# Patient Record
Sex: FEMALE | Race: WHITE | NOT HISPANIC OR LATINO | Employment: PART TIME
[De-identification: names, ages, dates, MRNs, and addresses within clinical notes are randomized per-mention and may not be internally consistent; named-entity substitution may affect disease eponyms.]

---

## 2022-02-06 ENCOUNTER — HEALTH MAINTENANCE LETTER (OUTPATIENT)
Age: 57
End: 2022-02-06

## 2022-10-01 ENCOUNTER — HEALTH MAINTENANCE LETTER (OUTPATIENT)
Age: 57
End: 2022-10-01

## 2023-05-14 ENCOUNTER — HEALTH MAINTENANCE LETTER (OUTPATIENT)
Age: 58
End: 2023-05-14

## 2023-07-03 ENCOUNTER — HOSPITAL ENCOUNTER (OUTPATIENT)
Dept: MAMMOGRAPHY | Facility: CLINIC | Age: 58
Discharge: HOME | End: 2023-07-03
Admitting: RADIOLOGY
Payer: COMMERCIAL

## 2023-07-03 DIAGNOSIS — Z12.31 VISIT FOR SCREENING MAMMOGRAM: ICD-10-CM

## 2023-07-03 PROCEDURE — 77067 SCR MAMMO BI INCL CAD: CPT

## 2023-09-18 ENCOUNTER — LAB (OUTPATIENT)
Dept: FAMILY MEDICINE | Facility: CLINIC | Age: 58
End: 2023-09-18

## 2023-09-18 ENCOUNTER — OFFICE VISIT (OUTPATIENT)
Dept: FAMILY MEDICINE | Facility: CLINIC | Age: 58
End: 2023-09-18
Payer: COMMERCIAL

## 2023-09-18 VITALS
TEMPERATURE: 98.2 F | RESPIRATION RATE: 16 BRPM | WEIGHT: 155.5 LBS | BODY MASS INDEX: 26.55 KG/M2 | HEART RATE: 73 BPM | OXYGEN SATURATION: 98 % | DIASTOLIC BLOOD PRESSURE: 70 MMHG | HEIGHT: 64 IN | SYSTOLIC BLOOD PRESSURE: 110 MMHG

## 2023-09-18 DIAGNOSIS — G89.29 CHRONIC INTRACTABLE HEADACHE, UNSPECIFIED HEADACHE TYPE: ICD-10-CM

## 2023-09-18 DIAGNOSIS — E03.9 HYPOTHYROIDISM, UNSPECIFIED TYPE: ICD-10-CM

## 2023-09-18 DIAGNOSIS — R22.0 FACIAL SWELLING: ICD-10-CM

## 2023-09-18 DIAGNOSIS — Z13.220 SCREENING FOR HYPERLIPIDEMIA: ICD-10-CM

## 2023-09-18 DIAGNOSIS — Z12.11 SCREENING FOR COLON CANCER: ICD-10-CM

## 2023-09-18 DIAGNOSIS — R09.81 CONGESTION OF PARANASAL SINUS: Primary | ICD-10-CM

## 2023-09-18 DIAGNOSIS — R51.9 CHRONIC INTRACTABLE HEADACHE, UNSPECIFIED HEADACHE TYPE: ICD-10-CM

## 2023-09-18 DIAGNOSIS — R63.5 WEIGHT GAIN: ICD-10-CM

## 2023-09-18 PROBLEM — R79.89 ELEVATED TSH: Status: ACTIVE | Noted: 2021-06-08

## 2023-09-18 LAB
ANION GAP SERPL CALCULATED.3IONS-SCNC: 11 MMOL/L (ref 7–15)
BASOPHILS # BLD AUTO: 0 10E3/UL (ref 0–0.2)
BASOPHILS NFR BLD AUTO: 1 %
BUN SERPL-MCNC: 21.6 MG/DL (ref 8–23)
CALCIUM SERPL-MCNC: 9.7 MG/DL (ref 8.6–10)
CHLORIDE SERPL-SCNC: 102 MMOL/L (ref 98–107)
CHOLEST SERPL-MCNC: 342 MG/DL
CREAT SERPL-MCNC: 1.06 MG/DL (ref 0.51–0.95)
DEPRECATED HCO3 PLAS-SCNC: 27 MMOL/L (ref 22–29)
EGFRCR SERPLBLD CKD-EPI 2021: 60 ML/MIN/1.73M2
EOSINOPHIL # BLD AUTO: 0.2 10E3/UL (ref 0–0.7)
EOSINOPHIL NFR BLD AUTO: 4 %
ERYTHROCYTE [DISTWIDTH] IN BLOOD BY AUTOMATED COUNT: 14.3 % (ref 10–15)
GLUCOSE SERPL-MCNC: 89 MG/DL (ref 70–99)
HBA1C MFR BLD: 5.4 % (ref 0–5.6)
HCT VFR BLD AUTO: 36.9 % (ref 35–47)
HDLC SERPL-MCNC: 152 MG/DL
HGB BLD-MCNC: 12 G/DL (ref 11.7–15.7)
IMM GRANULOCYTES # BLD: 0 10E3/UL
IMM GRANULOCYTES NFR BLD: 0 %
LDLC SERPL CALC-MCNC: 180 MG/DL
LYMPHOCYTES # BLD AUTO: 1.5 10E3/UL (ref 0.8–5.3)
LYMPHOCYTES NFR BLD AUTO: 23 %
MCH RBC QN AUTO: 30.1 PG (ref 26.5–33)
MCHC RBC AUTO-ENTMCNC: 32.5 G/DL (ref 31.5–36.5)
MCV RBC AUTO: 93 FL (ref 78–100)
MONOCYTES # BLD AUTO: 0.4 10E3/UL (ref 0–1.3)
MONOCYTES NFR BLD AUTO: 6 %
NEUTROPHILS # BLD AUTO: 4.2 10E3/UL (ref 1.6–8.3)
NEUTROPHILS NFR BLD AUTO: 67 %
NONHDLC SERPL-MCNC: 190 MG/DL
PLATELET # BLD AUTO: 307 10E3/UL (ref 150–450)
POTASSIUM SERPL-SCNC: 4.2 MMOL/L (ref 3.4–5.3)
RBC # BLD AUTO: 3.99 10E6/UL (ref 3.8–5.2)
SODIUM SERPL-SCNC: 140 MMOL/L (ref 136–145)
T4 FREE SERPL-MCNC: <0.1 NG/DL (ref 0.9–1.7)
TRIGL SERPL-MCNC: 48 MG/DL
TSH SERPL DL<=0.005 MIU/L-ACNC: 76.4 UIU/ML (ref 0.3–4.2)
WBC # BLD AUTO: 6.3 10E3/UL (ref 4–11)

## 2023-09-18 PROCEDURE — 80061 LIPID PANEL: CPT | Performed by: NURSE PRACTITIONER

## 2023-09-18 PROCEDURE — 80048 BASIC METABOLIC PNL TOTAL CA: CPT | Performed by: NURSE PRACTITIONER

## 2023-09-18 PROCEDURE — 85025 COMPLETE CBC W/AUTO DIFF WBC: CPT | Performed by: NURSE PRACTITIONER

## 2023-09-18 PROCEDURE — 36415 COLL VENOUS BLD VENIPUNCTURE: CPT | Performed by: NURSE PRACTITIONER

## 2023-09-18 PROCEDURE — 84439 ASSAY OF FREE THYROXINE: CPT | Performed by: NURSE PRACTITIONER

## 2023-09-18 PROCEDURE — 99204 OFFICE O/P NEW MOD 45 MIN: CPT | Performed by: NURSE PRACTITIONER

## 2023-09-18 PROCEDURE — 83036 HEMOGLOBIN GLYCOSYLATED A1C: CPT | Performed by: NURSE PRACTITIONER

## 2023-09-18 PROCEDURE — 84443 ASSAY THYROID STIM HORMONE: CPT | Performed by: NURSE PRACTITIONER

## 2023-09-18 RX ORDER — LACTOBACILLUS RHAMNOSUS GG 10B CELL
1 CAPSULE ORAL 2 TIMES DAILY
COMMUNITY
End: 2023-11-29

## 2023-09-18 RX ORDER — LORATADINE 10 MG/1
10 TABLET ORAL DAILY
COMMUNITY
End: 2023-11-29

## 2023-09-18 RX ORDER — FLUTICASONE PROPIONATE 50 MCG
1 SPRAY, SUSPENSION (ML) NASAL DAILY
Qty: 16 G | Refills: 1 | Status: SHIPPED | OUTPATIENT
Start: 2023-09-18 | End: 2023-11-29

## 2023-09-18 RX ORDER — DOXYCYCLINE 100 MG/1
CAPSULE ORAL
COMMUNITY
Start: 2023-09-13 | End: 2023-11-29

## 2023-09-18 RX ORDER — GUAIFENESIN 1200 MG/1
TABLET, EXTENDED RELEASE ORAL
COMMUNITY
End: 2023-11-29

## 2023-09-18 NOTE — PROGRESS NOTES
Assessment & Plan     This is a 58-year-old patient here for establish care as well as follow-up for urgent care visit for sinusitis.  Patient has allergic rhinitis at baseline which she treats with nonantihistamines.  She started antihistamines of Claritin once daily and over-the-counter decongestant (not pseudoephedrine), and Mucinex.  Discussed with patient to continue Claritin daily and add Flonase 1 spray each nostril daily.  Discussed patient with take to take pseudoephedrine extended release as needed.  Discussed that this is behind the counter at the pharmacy.  Discussed that the red pill she is taking now are not likely to be effective to help with congestion.  Discussed Mucinex is not for allergies and she should discontinue use of that medication as well.  May do saline rinses.  Discussed with patient to finish her last dose of her antibiotic and will repeat a sinus CT scan today.  Patient says she still has postnasal drip as well as the sensation of congestion with headaches.  Discussed risk of rebound headache if taking daily ibuprofen.  Discussed take medication as needed for headaches but sparingly.    Patient to follow-up in 30 days after the use of antihistamine and nasal Flonase.    Congestion of paranasal sinus  See above.  She likely has a secondary to seasonal allergies  Discussed removal and avoidance of allergy triggers.  Patient should close the window at night and use air conditioning or to filter allergies.  She can also use of HEPA filter  - CT Facial Bones without Contrast  - CBC with platelets and differential  - fluticasone (FLONASE) 50 MCG/ACT nasal spray  Dispense: 16 g; Refill: 1  - CBC with platelets and differential    Screening for colon cancer  Discussed risk and benefits of colonoscopy versus Cologuard.  Patient opts for Cologuard.  Discussed that she must place it in the [employer] store on the day of collection.  Patient agrees to plan of care  - COLOGUARD([employer])    Facial  "swelling  Rechecking TSH today as well as CBC with differential to rule out other causes of facial swelling.  Patient denies chronic use of steroids, does not have appearance of Cushing's disease per physical exam today  - CT Facial Bones without Contrast  - TSH with free T4 reflex  - TSH with free T4 reflex    Weight gain  Checking hemoglobin A1c as well as cholesterol.  Discussed diet changes including decrease her intake of carbohydrates and simple sugars  - Lipid Profile (Chol, Trig, HDL, LDL calc)  - Hemoglobin A1c  - Basic metabolic panel  (Ca, Cl, CO2, Creat, Gluc, K, Na, BUN)  - Lipid Profile (Chol, Trig, HDL, LDL calc)  - Hemoglobin A1c  - Basic metabolic panel  (Ca, Cl, CO2, Creat, Gluc, K, Na, BUN)    Chronic intractable headache, unspecified headache type  Discussed differential diagnosis.  Does not appear to be migraine headaches.  Possible tiredness sinus headaches versus tension headaches we will check CT scan  Patient may take Tylenol or ibuprofen as needed I suggest alternating these and not taking them every day as she does have a risk of rebound headaches.  - TSH with free T4 reflex  - Hemoglobin A1c  - Basic metabolic panel  (Ca, Cl, CO2, Creat, Gluc, K, Na, BUN)  - TSH with free T4 reflex  - Hemoglobin A1c  - Basic metabolic panel  (Ca, Cl, CO2, Creat, Gluc, K, Na, BUN)    Screening for hyperlipidemia  Patient is not fasting today we will check cholesterol  - Lipid Profile (Chol, Trig, HDL, LDL calc)  - Basic metabolic panel  (Ca, Cl, CO2, Creat, Gluc, K, Na, BUN)  - Lipid Profile (Chol, Trig, HDL, LDL calc)  - Basic metabolic panel  (Ca, Cl, CO2, Creat, Gluc, K, Na, BUN)          {  BMI:   Estimated body mass index is 26.69 kg/m  as calculated from the following:    Height as of this encounter: 1.626 m (5' 4\").    Weight as of this encounter: 70.5 kg (155 lb 8 oz).   Weight management plan: Discussed healthy diet and exercise guidelines        RENU Correia [facility] " "DAVID Guzman is a 58 year old, presenting for the following health issues:  Follow Up (Follow up from urgent care and would like to talk about doctor for insurance.)        9/18/2023     8:47 AM   Additional Questions   Roomed by lc-lpn   Accompanied by ally         9/18/2023     8:47 AM   Patient Reported Additional Medications   Patient reports taking the following new medications na       HPI   Went to urgent care - unable to see on care everywhere  Wednesday is last day on Abx (doxy BID for 7 days)  Taking madden all clear (Loratidine), pseudoephedrine, and ibuprofen when getting the headaches, and sx started 8/25/23 - was getting headaches and pressure, and puff eyes and     Denies fever, has some PND, no cough, denies sore throat, headaches mild taking ibuprofen 4 tabs/day      Previously did netti pots and still suffered.     Has seasonal allergies - fall and spring - had allergies a long time ago - allergies get worse when windows are open.   No trauma to nose or face    Sister does have allergies - and is \"puffy\"    Eating a lot of carbs - having some weight gain.   And sweets - does this when she is sick     Feels run down, under a lot of stress.     Has electrolyte drinks x2 - LMNT (Ultima)            Review of Systems   Constitutional, HEENT, cardiovascular, pulmonary, gi and gu systems are negative, except as otherwise noted.      Objective    /70 (BP Location: Right arm, Patient Position: Sitting, Cuff Size: Adult Regular)   Pulse 73   Temp 98.2  F (36.8  C) (Oral)   Resp 16   Ht 1.626 m (5' 4\")   Wt 70.5 kg (155 lb 8 oz)   SpO2 98%   BMI 26.69 kg/m    Body mass index is 26.69 kg/m .  Physical Exam   GENERAL: healthy, alert and no distress  ENT: TM normal, posterior pharynx pink , no exudate tonsils 1+ BL. Right turbinate boggy, facial nerve intact BL, right maxillary slightly more \"puffy\" than left, no facial tenderness on palpation or percussion  Mouth: no " lesions, good dental hygiene   NECK: no adenopathy, no asymmetry, masses, or scars and thyroid normal to palpation  RESP: lungs clear to auscultation - no rales, rhonchi or wheezes  CV: regular rate and rhythm, normal S1 S2, no S3 or S4, no murmur, click or rub, no peripheral edema and peripheral pulses strong  ABDOMEN: soft, nontender, no hepatosplenomegaly, no masses and bowel sounds normal  MS: no gross musculoskeletal defects noted, no edema              Results for orders placed or performed in visit on 09/18/23   Hemoglobin A1c     Status: Normal   Result Value Ref Range    Hemoglobin A1C 5.4 0.0 - 5.6 %   CBC with platelets and differential     Status: None   Result Value Ref Range    WBC Count 6.3 4.0 - 11.0 10e3/uL    RBC Count 3.99 3.80 - 5.20 10e6/uL    Hemoglobin 12.0 11.7 - 15.7 g/dL    Hematocrit 36.9 35.0 - 47.0 %    MCV 93 78 - 100 fL    MCH 30.1 26.5 - 33.0 pg    MCHC 32.5 31.5 - 36.5 g/dL    RDW 14.3 10.0 - 15.0 %    Platelet Count 307 150 - 450 10e3/uL    % Neutrophils 67 %    % Lymphocytes 23 %    % Monocytes 6 %    % Eosinophils 4 %    % Basophils 1 %    % Immature Granulocytes 0 %    Absolute Neutrophils 4.2 1.6 - 8.3 10e3/uL    Absolute Lymphocytes 1.5 0.8 - 5.3 10e3/uL    Absolute Monocytes 0.4 0.0 - 1.3 10e3/uL    Absolute Eosinophils 0.2 0.0 - 0.7 10e3/uL    Absolute Basophils 0.0 0.0 - 0.2 10e3/uL    Absolute Immature Granulocytes 0.0 <=0.4 10e3/uL   CBC with platelets and differential     Status: None    Narrative    The following orders were created for panel order CBC with platelets and differential.  Procedure                               Abnormality         Status                     ---------                               -----------         ------                     CBC with platelets and d...[[id number]]                      Final result                 Please view results for these tests on the individual orders.

## 2023-09-18 NOTE — PATIENT INSTRUCTIONS
For congestion:   Continue pseudoephedrine 12 hour (as needed for sinus pressure- purchase   from behind the pharmacy counter), loratadine (madden brand), flonase daily.    -CT scan of sinuses - you'll get a call to schedule.    Headache - Ibuprofen and acetaminophen (switching off - try not to take daily if you can.      -cologard - in the mail  bring in to a [employer] store on same day you collect it (good every 3 years)     - labs results will come in the next day on [employer]

## 2023-09-19 ENCOUNTER — TELEPHONE (OUTPATIENT)
Dept: FAMILY MEDICINE | Facility: CLINIC | Age: 58
End: 2023-09-19
Payer: COMMERCIAL

## 2023-09-19 RX ORDER — LEVOTHYROXINE SODIUM 100 UG/1
100 TABLET ORAL DAILY
Qty: 60 TABLET | Refills: 0 | Status: SHIPPED | OUTPATIENT
Start: 2023-09-19 | End: 2023-11-08

## 2023-09-19 NOTE — TELEPHONE ENCOUNTER
S-(situation): Call back to patient to relay message.    B-(background): See previous    A-(assessment): Per chart review, patient has viewed results on [employer]:      R-(recommendations): Called patient to inform that there is nothing new - the message attached to her results in [employer] has the same information we intended to communicate by phone. Invited call back if further questions or concerns.

## 2023-09-19 NOTE — TELEPHONE ENCOUNTER
"LMTCB, please relay to any available RN    ----- Message from RENU Correia CNP sent at 9/19/2023  9:16 AM CDT -----  Megan,  You have a very high TSH - and low T4.   This means you're experiencing hypothyroidism. I suggest starting on a thyroid medication right away. This medication is called levothyroxine. You should take it first thing in the morning with ag glass of water on an empty stomach.  Wait 20 mins after taking the medication before eating. I've given you some information on hypothyroidism in your patient instructions from yesterday appointment.  This is likely the reason you are experiencing a more \"puffy face.\"     Your HDL is Very high - this is your good cholesterol.  Your LDL is also high.  I recommend starting on a cholesterol lowering medication. This can be discussed at your follow up appointment.     You have no Diabetes, no amenia or acute infection, normal electrolytes. However, you kidney function looks to have changed.  Please do NOT take any IBUPROFEN, (ADVIL, MOTRIN), or ALEVE (Naproxen).  You may take acetaminophen for your headaches.     Please came back in 4-6 weeks to discuss your cholesterol, thyroid, and recheck you labs.     Please let me know if you have any questions or concerns.    Thank you for trusting us with your care. It was a pleasure seeing you.  RENU Correia CNP on 9/19/2023 at 9:09 AM      "

## 2023-09-19 NOTE — RESULT ENCOUNTER NOTE
"Megan,  You have a very high TSH - and low T4.   This means you're experiencing hypothyroidism. I suggest starting on a thyroid medication right away. This medication is called levothyroxine. You should take it first thing in the morning with ag glass of water on an empty stomach.  Wait 20 mins after taking the medication before eating. I've given you some information on hypothyroidism in your patient instructions from yesterday appointment.  This is likely the reason you are experiencing a more \"puffy face.\"     Your HDL is Very high - this is your good cholesterol.  Your LDL is also high.  I recommend starting on a cholesterol lowering medication. This can be discussed at your follow up appointment.     You have no Diabetes, no amenia or acute infection, normal electrolytes. However, you kidney function looks to have changed.  Please do NOT take any IBUPROFEN, (ADVIL, MOTRIN), or ALEVE (Naproxen).  You may take acetaminophen for your headaches.     Please came back in 4-6 weeks to discuss your cholesterol, thyroid, and recheck you labs.     Please let me know if you have any questions or concerns.    Thank you for trusting us with your care. It was a pleasure seeing you.  RENU Correia CNP on 9/19/2023 at 9:09 AM    "

## 2023-09-19 NOTE — TELEPHONE ENCOUNTER
Patient returning a call per message.  Patient said she is at work and would like a call back about 5:00 or 5:30 PM or a message can be left.   Phone number [phone].

## 2023-09-20 ENCOUNTER — HOSPITAL ENCOUNTER (OUTPATIENT)
Dept: CT IMAGING | Facility: CLINIC | Age: 58
Discharge: HOME | End: 2023-09-20
Attending: NURSE PRACTITIONER | Admitting: NURSE PRACTITIONER
Payer: COMMERCIAL

## 2023-09-20 DIAGNOSIS — R22.0 FACIAL SWELLING: ICD-10-CM

## 2023-09-20 DIAGNOSIS — R09.81 CONGESTION OF PARANASAL SINUS: ICD-10-CM

## 2023-09-20 PROCEDURE — 70486 CT MAXILLOFACIAL W/O DYE: CPT

## 2023-09-29 ENCOUNTER — MYC MEDICAL ADVICE (OUTPATIENT)
Dept: FAMILY MEDICINE | Facility: CLINIC | Age: 58
End: 2023-09-29
Payer: COMMERCIAL

## 2023-09-29 DIAGNOSIS — E66.3 OVERWEIGHT (BMI 25.0-29.9): Primary | ICD-10-CM

## 2023-09-29 LAB — NONINV COLON CA DNA+OCC BLD SCRN STL QL: NEGATIVE

## 2023-09-29 NOTE — TELEPHONE ENCOUNTER
Pt called and stated she needs a Dr Note from PCP for her insurance company to approve her to see a nutritionist. Pt will like the letter to read the following information.     Please send back to Pt via [employer]. Please call Pt with any questions/concerns.    Sandrine Teresa

## 2023-10-06 NOTE — TELEPHONE ENCOUNTER
10/06/23  Please send nutrition referral to:  [facility]  Fax:  [phone]   ATTN: Insurance Department /Leyda

## 2023-10-19 ENCOUNTER — MYC MEDICAL ADVICE (OUTPATIENT)
Dept: FAMILY MEDICINE | Facility: CLINIC | Age: 58
End: 2023-10-19
Payer: COMMERCIAL

## 2023-10-19 ENCOUNTER — TELEPHONE (OUTPATIENT)
Dept: FAMILY MEDICINE | Facility: CLINIC | Age: 58
End: 2023-10-19
Payer: COMMERCIAL

## 2023-10-19 DIAGNOSIS — E03.9 HYPOTHYROIDISM, UNSPECIFIED TYPE: Primary | ICD-10-CM

## 2023-10-19 NOTE — TELEPHONE ENCOUNTER
Order/Referral Request    Who is requesting:  Patient's Nutritionist (Patient called in request)    Orders being requested: Labs for   1. TSH  2. Free T4  3. Free T3  4. Antibody    Reason service is needed/diagnosis: Hypothyroid    When are orders needed by: prior to patient seeing PCP on 11/29/23    Has this been discussed with Provider: Yes, patient was referred to Nutritionist    Does patient have a preference on a Group/Provider/Facility? [facility]    Does patient have an appointment scheduled?: Yes: patient's 11/16/29 appointment was [facility]/233 [facility]    Where to send orders: Place orders within Epic    Could we send this information to you in [employer] or would you prefer to receive a phone call?:   Patient would prefer a phone call   Okay to leave a detailed message?: Yes at Cell number on file:    Telephone Information:   Mobile [phone]

## 2023-10-24 NOTE — TELEPHONE ENCOUNTER
LMTCB to schedule a lab only visit. Please help schedule a lab visit if/when patient calls back.  Sharon Alexander MA on 10/24/2023 at 10:31 AM

## 2023-11-01 NOTE — TELEPHONE ENCOUNTER
11/01/23  Pt states the fax number for [facility] has changed so they never received the referral. Please refax to:     [facility]   ATTN: Insurance Department /Leyda   [phone]

## 2023-11-07 ENCOUNTER — LAB (OUTPATIENT)
Dept: LAB | Facility: CLINIC | Age: 58
End: 2023-11-07
Payer: COMMERCIAL

## 2023-11-07 DIAGNOSIS — E03.9 HYPOTHYROIDISM, UNSPECIFIED TYPE: ICD-10-CM

## 2023-11-07 LAB
Lab: NORMAL
PERFORMING LABORATORY: NORMAL
SPECIMEN STATUS: NORMAL
T3 SERPL-MCNC: 66 NG/DL (ref 85–202)
TEST NAME: NORMAL
THYROGLOB AB SERPL IA-ACNC: 23 IU/ML
TSH SERPL DL<=0.005 MIU/L-ACNC: 1.99 UIU/ML (ref 0.3–4.2)

## 2023-11-07 PROCEDURE — 84443 ASSAY THYROID STIM HORMONE: CPT

## 2023-11-07 PROCEDURE — 86800 THYROGLOBULIN ANTIBODY: CPT

## 2023-11-07 PROCEDURE — 86376 MICROSOMAL ANTIBODY EACH: CPT

## 2023-11-07 PROCEDURE — 36415 COLL VENOUS BLD VENIPUNCTURE: CPT

## 2023-11-07 PROCEDURE — 86800 THYROGLOBULIN ANTIBODY: CPT | Mod: 59

## 2023-11-07 PROCEDURE — 84480 ASSAY TRIIODOTHYRONINE (T3): CPT

## 2023-11-08 ENCOUNTER — MYC REFILL (OUTPATIENT)
Dept: FAMILY MEDICINE | Facility: CLINIC | Age: 58
End: 2023-11-08
Payer: COMMERCIAL

## 2023-11-08 DIAGNOSIS — E03.9 HYPOTHYROIDISM, UNSPECIFIED TYPE: ICD-10-CM

## 2023-11-08 RX ORDER — LEVOTHYROXINE SODIUM 100 UG/1
100 TABLET ORAL DAILY
Qty: 60 TABLET | Refills: 0 | Status: SHIPPED | OUTPATIENT
Start: 2023-11-08 | End: 2023-11-29

## 2023-11-08 NOTE — RESULT ENCOUNTER NOTE
Significant improvement seen in your TSH.  Your T4 is normal but your T3 is still little low.  This can lag a slightly.  At this point I think you should continue your current dose of the levothyroxine until we discussed and I do a physical exam with you on the 29th of this month.  If having any further symptoms of hypothyroidism we will consider increasing the dose versus just rechecking the TSH.  I am also adding a TPO level which will let me know if this is an autoimmune disorder which can highlight the cause of this abnormal thyroid result.  Please me know if you have any further questions, otherwise we can discuss this in depth on the 29th as well

## 2023-11-09 LAB — THYROPEROXIDASE AB SERPL-ACNC: 468 IU/ML

## 2023-11-14 LAB — MISCELLANEOUS TEST 1 (ARUP): ABNORMAL

## 2023-11-18 NOTE — RESULT ENCOUNTER NOTE
This labs confirms that your thyroid abnormality is characterized by Hashimoto's Thyroiditis- an auto immune disorder.

## 2023-11-29 ENCOUNTER — OFFICE VISIT (OUTPATIENT)
Dept: FAMILY MEDICINE | Facility: CLINIC | Age: 58
End: 2023-11-29
Payer: COMMERCIAL

## 2023-11-29 VITALS
SYSTOLIC BLOOD PRESSURE: 100 MMHG | DIASTOLIC BLOOD PRESSURE: 74 MMHG | TEMPERATURE: 98.2 F | BODY MASS INDEX: 26.14 KG/M2 | HEIGHT: 64 IN | WEIGHT: 153.1 LBS | RESPIRATION RATE: 16 BRPM | OXYGEN SATURATION: 99 % | HEART RATE: 67 BPM

## 2023-11-29 DIAGNOSIS — E06.3 HASHIMOTO'S THYROIDITIS: Primary | ICD-10-CM

## 2023-11-29 DIAGNOSIS — E03.9 HYPOTHYROIDISM, UNSPECIFIED TYPE: ICD-10-CM

## 2023-11-29 DIAGNOSIS — R09.A2 GLOBUS SENSATION: ICD-10-CM

## 2023-11-29 LAB
T3 SERPL-MCNC: 72 NG/DL (ref 85–202)
TSH SERPL DL<=0.005 MIU/L-ACNC: 0.84 UIU/ML (ref 0.3–4.2)

## 2023-11-29 PROCEDURE — 36415 COLL VENOUS BLD VENIPUNCTURE: CPT | Performed by: NURSE PRACTITIONER

## 2023-11-29 PROCEDURE — 99214 OFFICE O/P EST MOD 30 MIN: CPT | Performed by: NURSE PRACTITIONER

## 2023-11-29 PROCEDURE — 84480 ASSAY TRIIODOTHYRONINE (T3): CPT | Performed by: NURSE PRACTITIONER

## 2023-11-29 PROCEDURE — 84439 ASSAY OF FREE THYROXINE: CPT | Performed by: NURSE PRACTITIONER

## 2023-11-29 PROCEDURE — 84443 ASSAY THYROID STIM HORMONE: CPT | Performed by: NURSE PRACTITIONER

## 2023-11-29 RX ORDER — FAMOTIDINE 20 MG/1
20 TABLET, FILM COATED ORAL 2 TIMES DAILY
COMMUNITY
Start: 2023-11-29

## 2023-11-29 RX ORDER — LEVOTHYROXINE SODIUM 100 UG/1
100 TABLET ORAL DAILY
Qty: 90 TABLET | Refills: 1 | Status: SHIPPED | OUTPATIENT
Start: 2023-11-29 | End: 2024-05-31

## 2023-11-29 NOTE — PROGRESS NOTES
Assessment & Plan     Hypothyroidism, Hashimoto's  Patient on levo 100 mcg.  Will continue this at this time rechecking TSH T4 T3 today.  Symptomatically patient is feeling better, more energy, less weight gain, less puffiness, improved hair and skin.  TSH and T3 are in good range today will continue with the follow-up every 6 months for the first year and then annually  - TSH with free T4 reflex  - T3, total  - TSH with free T4 reflex  - T3, total    Globus sensation  Patient presents today with history of chronic congestion, and high phlegm production to which she seen some improvement since being treated for hypothyroidism as well as diet change.    She has globus sensation in the throat she reports having for years, that is negative for pain current, coughing, throat clearing after eating or drinking but does report worsening symptoms with frequent talking.  She does not have any history of reflux but wonders if that is the case.  -Information about GERD precautions given to patient today discussed she may also do a trial of p.o. famotidine with dinner for 30 to 60 days to see if this improves.  -Referral placed to ENT to perform a scope to rule out masses, nodules, and observe for signs of GERD  - Adult ENT  Referral    Spent 30mins doing chart review, history and exam, patient education, documentation and further activities per the note.                   RENU Correia [facility]    Adriel Guzman is a 58 year old, presenting for the following health issues:  Follow Up (Follow up on thyroid, and medications. Throat fells like she has mucus in the back of her throat, silent reflex, and dry throat.)        11/29/2023     7:41 AM   Additional Questions   Roomed by lc-lpn   Accompanied by n/a       History of Present Illness       Hypothyroidism:     Since last visit, patient describes the following symptoms::  Anxiety, Constipation, Dry skin and Hair  "loss    She eats 4 or more servings of fruits and vegetables daily.She consumes 0 sweetened beverage(s) daily.She exercises with enough effort to increase her heart rate 10 to 19 minutes per day.  She exercises with enough effort to increase her heart rate 4 days per week.   She is taking medications regularly.     Thyroid:   Denies hot or cold intolerance,  has hot flashes here and there-will sometimes wake up hot. Feels her energy better, and dry skin has helped. She feels her hair has improved   She is on a \"Thyroid diet\"    Taking magnesium glycinate 400mg. Having BM daily and lerger quantity - does drink water. Taking \"the greens\" drink    Has been changing her diet: gluten, dairy free, and egg free, has already had difficulties with sinuses, and needed mucinex, feels like a mass, when she talks she feels she needs to be drinking to help her.  Not talking a lot at her job now, but if she did she herrera feel her voice getting tired.     She feels that when she eats eggs her symptoms are worse - phlegm  .   Overall, since starting Levothyroxine 100mg congestion has improved, weight gain has improved              Review of Systems   Constitutional, HEENT, cardiovascular, pulmonary, gi and gu systems are negative, except as otherwise noted.      Objective    /74 (BP Location: Right arm, Patient Position: Sitting, Cuff Size: Adult Regular)   Pulse 67   Temp 98.2  F (36.8  C) (Oral)   Resp 16   Ht 1.626 m (5' 4\")   Wt 69.4 kg (153 lb 1.6 oz)   SpO2 99%   BMI 26.28 kg/m    Body mass index is 26.28 kg/m .  Physical Exam   GENERAL: healthy, alert and no distress  NECK: no adenopathy, no asymmetry, masses, or scars and thyroid normal to palpation  RESP: lungs clear to auscultation - no rales, rhonchi or wheezes  CV: regular rate and rhythm, normal S1 S2, no S3 or S4, no murmur, click or rub, no peripheral edema and peripheral pulses strong  MS: no gross musculoskeletal defects noted, no edema            "

## 2023-11-30 ENCOUNTER — MYC MEDICAL ADVICE (OUTPATIENT)
Dept: FAMILY MEDICINE | Facility: CLINIC | Age: 58
End: 2023-11-30
Payer: COMMERCIAL

## 2023-11-30 DIAGNOSIS — E03.9 HYPOTHYROIDISM, UNSPECIFIED TYPE: ICD-10-CM

## 2023-11-30 DIAGNOSIS — E06.3 HASHIMOTO'S THYROIDITIS: Primary | ICD-10-CM

## 2023-11-30 LAB — T4 FREE SERPL-MCNC: 1.68 NG/DL (ref 0.9–1.7)

## 2023-11-30 NOTE — RESULT ENCOUNTER NOTE
Your TSH is on the low end of normal.  At this point we should continue the current dose of Levo, as increasing the dose would lower your TSH too much.  Your T3 continues to be slightly low. I will check your T4 level today as well.

## 2023-12-05 ENCOUNTER — LAB (OUTPATIENT)
Dept: LAB | Facility: CLINIC | Age: 58
End: 2023-12-05
Payer: COMMERCIAL

## 2023-12-05 DIAGNOSIS — E03.9 HYPOTHYROIDISM, UNSPECIFIED TYPE: ICD-10-CM

## 2023-12-05 DIAGNOSIS — E06.3 HASHIMOTO'S THYROIDITIS: ICD-10-CM

## 2023-12-05 PROCEDURE — 36415 COLL VENOUS BLD VENIPUNCTURE: CPT

## 2023-12-05 PROCEDURE — 99000 SPECIMEN HANDLING OFFICE-LAB: CPT

## 2023-12-05 PROCEDURE — 84482 T3 REVERSE: CPT | Mod: 90

## 2023-12-08 LAB — T3REVERSE SERPL-MCNC: 15.5 NG/DL

## 2024-01-05 ENCOUNTER — MYC REFILL (OUTPATIENT)
Dept: FAMILY MEDICINE | Facility: CLINIC | Age: 59
End: 2024-01-05
Payer: COMMERCIAL

## 2024-01-05 DIAGNOSIS — E03.9 HYPOTHYROIDISM, UNSPECIFIED TYPE: ICD-10-CM

## 2024-01-05 RX ORDER — LEVOTHYROXINE SODIUM 100 UG/1
100 TABLET ORAL DAILY
Qty: 90 TABLET | Refills: 1 | OUTPATIENT
Start: 2024-01-05

## 2024-05-18 ENCOUNTER — HEALTH MAINTENANCE LETTER (OUTPATIENT)
Age: 59
End: 2024-05-18

## 2024-05-28 ENCOUNTER — MYC MEDICAL ADVICE (OUTPATIENT)
Dept: FAMILY MEDICINE | Facility: CLINIC | Age: 59
End: 2024-05-28

## 2024-05-28 ENCOUNTER — LAB (OUTPATIENT)
Dept: LAB | Facility: CLINIC | Age: 59
End: 2024-05-28
Payer: COMMERCIAL

## 2024-05-28 DIAGNOSIS — Z11.4 SCREENING FOR HIV (HUMAN IMMUNODEFICIENCY VIRUS): Primary | ICD-10-CM

## 2024-05-28 DIAGNOSIS — Z11.59 NEED FOR HEPATITIS C SCREENING TEST: ICD-10-CM

## 2024-05-28 DIAGNOSIS — E06.3 HASHIMOTO'S THYROIDITIS: Primary | ICD-10-CM

## 2024-05-28 DIAGNOSIS — E06.3 HASHIMOTO'S THYROIDITIS: ICD-10-CM

## 2024-05-28 LAB
HCV AB SERPL QL IA: NONREACTIVE
HIV 1+2 AB+HIV1 P24 AG SERPL QL IA: NONREACTIVE
HOLD SPECIMEN: NORMAL

## 2024-05-28 PROCEDURE — 84480 ASSAY TRIIODOTHYRONINE (T3): CPT

## 2024-05-28 PROCEDURE — 84439 ASSAY OF FREE THYROXINE: CPT

## 2024-05-28 PROCEDURE — 86803 HEPATITIS C AB TEST: CPT

## 2024-05-28 PROCEDURE — 36415 COLL VENOUS BLD VENIPUNCTURE: CPT

## 2024-05-28 PROCEDURE — 87389 HIV-1 AG W/HIV-1&-2 AB AG IA: CPT

## 2024-05-28 PROCEDURE — 84443 ASSAY THYROID STIM HORMONE: CPT

## 2024-05-28 NOTE — TELEPHONE ENCOUNTER
"Call to lab. They timoteo a \"just in case\" tube of blood and are able to test blood sugar if ordered as an add on.     Forwarded to Mervat Herrera for review high priority due to time sensitive blood hold.   (I'm unable to order the \"add on\" feature so I wasn't able to pend the order for provider.)  "

## 2024-05-29 ENCOUNTER — VIRTUAL VISIT (OUTPATIENT)
Dept: FAMILY MEDICINE | Facility: CLINIC | Age: 59
End: 2024-05-29
Payer: COMMERCIAL

## 2024-05-29 DIAGNOSIS — E03.9 HYPOTHYROIDISM, UNSPECIFIED TYPE: ICD-10-CM

## 2024-05-29 DIAGNOSIS — R79.89 ELEVATED TSH: Primary | ICD-10-CM

## 2024-05-29 DIAGNOSIS — R09.A2 GLOBUS SENSATION: ICD-10-CM

## 2024-05-29 DIAGNOSIS — E06.3 HASHIMOTO'S THYROIDITIS: ICD-10-CM

## 2024-05-29 LAB
T3 SERPL-MCNC: 73 NG/DL (ref 85–202)
T4 FREE SERPL-MCNC: 1.82 NG/DL (ref 0.9–1.7)
TSH SERPL DL<=0.005 MIU/L-ACNC: 0.14 UIU/ML (ref 0.3–4.2)

## 2024-05-29 PROCEDURE — 99213 OFFICE O/P EST LOW 20 MIN: CPT | Mod: 95 | Performed by: NURSE PRACTITIONER

## 2024-05-29 NOTE — PROGRESS NOTES
"Nilsa is a 59 year old who is being evaluated via a billable video visit.          Assessment & Plan     Elevated TSH  Hashimoto's thyroiditis  Currently on levo 100mcg dauly - does get some off and on hot flashes -- more likely due to post menopause state - discussed can start treatment for this - pt wishes to hold off  She is losing weight steadily - about 1 pound a month since November.   Will await lab results to come back and refill medications accordingly   - TSH with free T4 reflex  - T3, total    Globus sensation  Improved with daily famotidine 20mg once  Phlegm - avoid food triggers.             BMI  Estimated body mass index is 26.28 kg/m  as calculated from the following:    Height as of 11/29/23: 1.626 m (5' 4\").    Weight as of 11/29/23: 69.4 kg (153 lb 1.6 oz).   Weight management plan: Discussed healthy diet and exercise guidelines             Subjective   Nilsa is a 59 year old, presenting for the following health issues:  Follow Up (Go over labs.)    HPI   Here for thyroid follow up     Thyroid:   Denies hot or cold intolerance,  has hot flashes here and there-will sometimes wake up hot - still occurring and having hot flashes.     Feels her energy better, and dry skin has helped. She feels her hair has improved      Taking magnesium glycinate 400mg.   Having BM daily and lerger quantity - does drink water. Taking \"the greens\" drink     Has been changing her diet: gluten, dairy free, and egg free, has already had difficulties with sinuses, and needed mucinex, feels like a mass, when she talks she feels she needs to be drinking to help her.  Not talking a lot at her job now, but if she did she herrera feel her voice getting tired.        .   Overall, since starting Levothyroxine 100mg congestion has improved, weight gain has improved     Wt Readings from Last 5 Encounters:   11/29/23 69.4 kg (153 lb 1.6 oz)   09/18/23 70.5 kg (155 lb 8 oz)    About 2 weeks ago 146#    Went on an autoimmune diet " initially - now trying to hold off on breakfast until 11am and stopping at 4-5 and eat later in the day - eating more vegetable    Taking something OTC every day with her vitamins. For acid reflux.  Famotidine. Noticed she doesn't have globus sensation.     She feels that when she eats eggs her symptoms are worse - phlegm          Review of Systems  Constitutional, HEENT, cardiovascular, pulmonary, gi and gu systems are negative, except as otherwise noted.      Objective           Vitals:  No vitals were obtained today due to virtual visit.    Physical Exam   GENERAL: alert and no distress  EYES: Eyes grossly normal to inspection.  No discharge or erythema, or obvious scleral/conjunctival abnormalities.  RESP: No audible wheeze, cough, or visible cyanosis.    SKIN: Visible skin clear. No significant rash, abnormal pigmentation or lesions.  NEURO: Cranial nerves grossly intact.  Mentation and speech appropriate for age.  PSYCH: Appropriate affect, tone, and pace of words          Video-Visit Details    Type of service:  Video Visit   Originating Location (pt. Location): Home  Joined the call at 5/29/2024, 10:45:38 am.  Left the call at 5/29/2024, 10:58:38 am.  You were on the call for 12 minutes 59 seconds .  Distant Location (provider location):  On-site  Platform used for Video Visit: Pablo  Signed Electronically by: RENU Correia CNP

## 2024-05-31 RX ORDER — LEVOTHYROXINE SODIUM 100 UG/1
TABLET ORAL
Qty: 90 TABLET | Refills: 1 | Status: SHIPPED | OUTPATIENT
Start: 2024-05-31 | End: 2024-07-02

## 2024-05-31 NOTE — RESULT ENCOUNTER NOTE
Provider called patient and discussed decrease in medications will have patient take 100 mcg 5 days a week and cut in half to 50 mcg 2 days a week  by 3 to 4 days.  Patient verbalized understanding agrees with plan of care.  Providers placed an order for TSH recheck.  Patient to schedule lab only visit in 4 to 8 weeks.    RENU Correia CNP on 5/31/2024 at 9:01 AM

## 2024-06-24 NOTE — PROGRESS NOTES
ASSESSMENT & PLAN    Nilsa was seen today for pain and pain.    Diagnoses and all orders for this visit:    Bilateral plantar fasciitis        Bilateral plantar fascitis heel pain left> right with pain at heel ith dorsiflexion along plantar fascia.  Pain tolerable today. Open to trial of conservative treatment. Has home exercises that have been helping. Ambulating without pain do not think need to immobilize at this time.   PLAN:   -Over the counter insoles ( listed in AVS)   -Voltaren gel ( or also known as diclofenac gel) to area of pain up to three times per day as needed. Can get over the counter at drug store  -Continue stretches and home strengthening as you can  -Tylenol 500-1000mg (up to three times per day) and ibuprofen 600mg (up to three times per day) as needed for pain and swelling. Always take ibuprofen with food.    -Follow-up if no improvement      Ira Parmar DO  [facility]    -----  Chief Complaint   Patient presents with    Left Foot - Pain    Right Foot - Pain       SUBJECTIVE  Megan ALEX Church is a/an 59 year old female who is seen as a self referral for evaluation of chronic bilateral foot pain, left>>>right.     The patient is seen by themselves.    Onset: 12 month(s) ago. Reports insidious onset without acute precipitating event that initially started with prolonged standing/walking, [place]  Location of Pain: bilateral plantar heel, medial arch  Worsened by: going to  AM or after prolonged sitting, prolonged walking  Better with: activity modification, massage  Treatments tried: rest/activity avoidance, ice, home exercises, and self massage, wearing flat shoes  Associated symptoms: no distal numbness or tingling; denies swelling or warmth  Denies numbness, tingling, locking  Orthopedic/Surgical history: NO  Social History/Occupation: works as [email or web address] for [email or web address]    Patient  "Active Problem List   Diagnosis    Adenomatous polyp of colon    Seasonal allergies    Elevated TSH    Hashimoto's thyroiditis    Globus sensation       Current Outpatient Medications   Medication Sig Dispense Refill    famotidine (PEPCID) 20 MG tablet Take 1 tablet (20 mg) by mouth 2 times daily      levothyroxine (SYNTHROID/LEVOTHROID) 100 MCG tablet Take 1 tablet by mouth on an empty stomach 5 days a week, and 1/2 tablet by mouth 2 days a week 90 tablet 1       PMH, Medications and Allergies were reviewed and updated as needed.    REVIEW OF SYSTEMS:  10 point ROS is negative other than symptoms noted above in HPI        OBJECTIVE:  Ht 1.626 m (5' 4\")   Wt 67.1 kg (148 lb)   BMI 25.40 kg/m     General: healthy, alert and in no distress  Skin: no suspicious lesions or rash.  CV: distal perfusion intact BL LE  Resp: normal respiratory effort without conversational dyspnea   Psych: normal mood and affect  Gait: NORMAL  Neuro: Normal light sensory exam of BLLE    BILATERAL FOOT  Inspection:    no swelling over the dorsal and heel plantar aspect  Palpation:    Tender about the heel and plantar fascia insertion  Range of Motion:     Active ankle range of motion - restricted dorsiflexion L>R  Strength:    Intrinsic foot musculature strength - full    Ankle strength - full        RADIOLOGY:  Final results and radiologist's interpretation, available in the [facility] health record.    None completed today but can consider in the future                 Disclaimer: This note consists of symbols derived from keyboarding, dictation and/or voice recognition software. As a result, there may be errors in the script that have gone undetected. Please consider this when interpreting information found in this chart.   "

## 2024-06-27 ENCOUNTER — OFFICE VISIT (OUTPATIENT)
Dept: ORTHOPEDICS | Facility: CLINIC | Age: 59
End: 2024-06-27
Payer: COMMERCIAL

## 2024-06-27 VITALS — HEIGHT: 64 IN | WEIGHT: 148 LBS | BODY MASS INDEX: 25.27 KG/M2

## 2024-06-27 DIAGNOSIS — M72.2 BILATERAL PLANTAR FASCIITIS: Primary | ICD-10-CM

## 2024-06-27 PROCEDURE — 99203 OFFICE O/P NEW LOW 30 MIN: CPT | Performed by: STUDENT IN AN ORGANIZED HEALTH CARE EDUCATION/TRAINING PROGRAM

## 2024-06-27 NOTE — PATIENT INSTRUCTIONS
1. Bilateral plantar fasciitis      -Over the counter insoles  -Voltaren gel ( or also known as diclofenac gel) to area of pain up to three times per day as needed. Can get over the counter at drug store  -Continue stretches and home strengthening as you can  -Tylenol 500-1000mg (up to three times per day) and ibuprofen 600mg (up to three times per day) as needed for pain and swelling. Always take ibuprofen with food.        - PLANTAR FASCIITIS  Plantar fasciitis is often referred to as heel spurs or heel pain. Plantar fasciitis is a very common problem that affects people of all foot shapes, age, weight and activity level. Pain may be in the arch or on the weight-bearing surface of the heel. The pain may come on without injury or identifiable cause. Pain is generally present when first getting out of bed in the morning or up from a seated break.     CAUSES  The plantar fascia is a dense fibrous band of tissue that stretches across the bottom surface of the foot. The fascia helps support the foot muscles and arch. Plantar fasciitis is thought to be caused by mechanical strain or overload. Frequent walking without shoes or wearing unsupportive shoes is thought to cause structural overload and ultimately inflammation of the plantar fascia. Some people have heel spurs that can be seen on x-ray. The heel spur is actually a minor component of plantar fascitis and is largely ignored.       SELF TREATMENT   The easiest solution is to stop walking around your home without shoes. Plantar fasciitis is largely a shoe problem. Shoes are either not being worn often enough or your current shoes are inadequate for your weight, foot structure or activity level. The majority of shoes on the market today are not sufficient to resist development of plantar fasciitis or to promote healing. Assume that your current shoes are inadequate and will need to be replaced. Even high quality shoes wear out with 6 months to one year of frequent  use. Weight loss is another option. Losing ten pounds in the next two months may be enough to resolve the problem. Ice applied to the area of pain two to three times per day for ten minutes each session can be very helpful. Warm foot soaks in epsom salts can also relieve pain. This should continue until the problem resolves. Achilles tendon stretching is essential. Stretch multiple times daily to promote healing and to prevent recurrence in the future. Over all stretching of the body is helpful as well such as the calves, thighs and lower back. Normally when one area of the body is tight, other areas are too. Gentle Yoga can be good for this.     Over the counter topical anti inflammatories can be helpful such as biofreeze, bengay, salon pas, ect...  Oral ibuprofen or aleve is recommended as well to try to calm down inflammation.     Night splints can be helpful to gradually stretch the foot at night as a lot of pain is when you get up in the morning. Taking a towel or thera band and stretching the foot back multiple times before you get ou of bed can be beneficial as well.     MEDICAL TREATMENT  Medical treatments often include custom arch supports, cortisone injections, physical therapy, splints to be worn in bed, prescription medications and surgery. The home treatments listed above will be necessary regardless of these advanced medical treatments. Surgery is rarely needed but is very helpful in selected cases.     PROGNOSIS  Plantar fasciitis can last from one day to a lifetime. Some people get intermittent fascitis that is very short-lived. Others suffer daily for years. Excessive body weight, frequent bare foot walking, long hours on the feet, inadequate shoes, predisposing foot structures and excessive activity such as running are all potential issues that lead to chronic and/or recurring plantar fascitis. Having plantar fasciitis means that you are forever prone to this problem and will require modification  of some of the above factors. Most people seek treatment within one to four months. Healing usually requires a similar one to four month time frame. Healing time is relative to the amount of effort spent treating the problem.   Plantar fasciitis is highly recurrent. Risk factors often continue, including return to bare foot walking, inadequate shoes, excessive body weight, excessive activities, etc. Your life style and foot structure may predispose you to recurrent plantar fasciitis. A daily prevention regimen can be very helpful. Ongoing use of shoe inserts, careful attention to appropriate shoes, daily Achilles stretching, etc. may prevent recurrence. Prompt attention at the earliest warning signs of heel pain can resolve the problem in as short as a few days.     EXERCISES  Stair Exercise: Step on the stairs with the ball of your foot and hold your position for at least 15 seconds, then slowly step down with the heels of your foot. You can do this daily and as often as you want.   Picking the Towel: Sit comfortably and then pick the towel up with your toes. You can use any object other than a towel as long as the material can be soft and you can pick it up with your toes.  Rolling the Bottle: Use a small ball or frozen water bottle and then roll it around with your foot.   Flex the Toes: Sit comfortably and then flex your toes by pointing it towards the floor or towards your body. This will relax and flex your foot and exercise your plantar fascia, the calf, and the Achilles tendon. The inability of the foot to stretch often causes the bunching up of the plantar fascia area leading to the pain.  Calf/Achilles Stretching: Lay on you back and raise one foot, then point your toes towards the floor. See photo below:               Hold each stretch for 10 seconds. Stretch 10 times per set, three sets per day. Morning, afternoon and evening. If your heel pain is very severe in the morning, consider doing the first set  of stretches before you get out of bed.      OVER THE COUNTER INSERT RECOMMENDATIONS  SuperFeet   Sofsole Fit Spenco   Power Step   Walk-Fit Arch Cradles     Most of these can be found at your local [employer], sporting [employer], or online.  **A good high quality over the counter insert should cost around $40-$50      YOSEPH SHOES LOCATIONS  [place]  [address] [facility]  [phone]   [address]  [phone] [place]  [address] [facility]  [phone]   [place]  [address] [facility] [address] [facility] [address] [facility] [address]  [phone]   [place]  [address]  [phone] [place]  [address] [place] [address] [place]  [phone]   [place]  [address]  [phone]   [place]  [address]  [phone] [place]  [address]  [phone]        Please call [phone]  Ask for my team if you have any questions or concerns    Ira Parmar DO  [facility]      Thank you for choosing [facility]!    CLINIC LOCATIONS:     [place]  TRIAGE LINE: [phone]   [address] [place] [address] APPOINTMENTS: [phone]   [place] [address] RADIOLOGY: [phone]   (Monday, Thursday & Friday) PHYSICAL THERAPY: [phone]    BILLING QUESTIONS: [phone]   [place] FAX: [phone] [address]    [place] [address]    (Wednesday)

## 2024-06-27 NOTE — LETTER
6/27/2024      Megan Church  [address]  [place] [address]      Dear Colleague,    Thank you for referring your patient, Megan Church, to the [facility]. Please see a copy of my visit note below.    ASSESSMENT & PLAN    Nilsa was seen today for pain and pain.    Diagnoses and all orders for this visit:    Bilateral plantar fasciitis        Bilateral plantar fascitis heel pain left> right with pain at heel ith dorsiflexion along plantar fascia.  Pain tolerable today. Open to trial of conservative treatment. Has home exercises that have been helping. Ambulating without pain do not think need to immobilize at this time.   PLAN:   -Over the counter insoles ( listed in AVS)   -Voltaren gel ( or also known as diclofenac gel) to area of pain up to three times per day as needed. Can get over the counter at drug store  -Continue stretches and home strengthening as you can  -Tylenol 500-1000mg (up to three times per day) and ibuprofen 600mg (up to three times per day) as needed for pain and swelling. Always take ibuprofen with food.    -Follow-up if no improvement      Ira Parmar DO  [facility]    -----  Chief Complaint   Patient presents with     Left Foot - Pain     Right Foot - Pain       SUBJECTIVE  Megna Church is a/an 59 year old female who is seen as a self referral for evaluation of chronic bilateral foot pain, left>>>right.     The patient is seen by themselves.    Onset: 12 month(s) ago. Reports insidious onset without acute precipitating event that initially started with prolonged standing/walking, [place]  Location of Pain: bilateral plantar heel, medial arch  Worsened by: going to  AM or after prolonged sitting, prolonged walking  Better with: activity modification, massage  Treatments tried: rest/activity avoidance, ice, home exercises,  "and self massage, wearing flat shoes  Associated symptoms: no distal numbness or tingling; denies swelling or warmth  Denies numbness, tingling, locking  Orthopedic/Surgical history: NO  Social History/Occupation: works as  for [employer]    Patient Active Problem List   Diagnosis     Adenomatous polyp of colon     Seasonal allergies     Elevated TSH     Hashimoto's thyroiditis     Globus sensation       Current Outpatient Medications   Medication Sig Dispense Refill     famotidine (PEPCID) 20 MG tablet Take 1 tablet (20 mg) by mouth 2 times daily       levothyroxine (SYNTHROID/LEVOTHROID) 100 MCG tablet Take 1 tablet by mouth on an empty stomach 5 days a week, and 1/2 tablet by mouth 2 days a week 90 tablet 1       PMH, Medications and Allergies were reviewed and updated as needed.    REVIEW OF SYSTEMS:  10 point ROS is negative other than symptoms noted above in HPI        OBJECTIVE:  Ht 1.626 m (5' 4\")   Wt 67.1 kg (148 lb)   BMI 25.40 kg/m     General: healthy, alert and in no distress  Skin: no suspicious lesions or rash.  CV: distal perfusion intact BL LE  Resp: normal respiratory effort without conversational dyspnea   Psych: normal mood and affect  Gait: NORMAL  Neuro: Normal light sensory exam of BLLE    BILATERAL FOOT  Inspection:    no swelling over the dorsal and heel plantar aspect  Palpation:    Tender about the heel and plantar fascia insertion  Range of Motion:     Active ankle range of motion - restricted dorsiflexion L>R  Strength:    Intrinsic foot musculature strength - full    Ankle strength - full        RADIOLOGY:  Final results and radiologist's interpretation, available in the [facility] health record.    None completed today but can consider in the future                 Disclaimer: This note consists of symbols derived from keyboarding, dictation and/or voice recognition software. As a result, there may be errors in the script that have gone undetected. Please consider " this when interpreting information found in this chart.       Again, thank you for allowing me to participate in the care of your patient.        Sincerely,        Ira Parmar, DO

## 2024-07-01 ENCOUNTER — LAB (OUTPATIENT)
Dept: LAB | Facility: CLINIC | Age: 59
End: 2024-07-01
Payer: COMMERCIAL

## 2024-07-01 DIAGNOSIS — E06.3 HASHIMOTO'S THYROIDITIS: ICD-10-CM

## 2024-07-01 LAB — TSH SERPL DL<=0.005 MIU/L-ACNC: 1.37 UIU/ML (ref 0.3–4.2)

## 2024-07-01 PROCEDURE — 84443 ASSAY THYROID STIM HORMONE: CPT

## 2024-07-01 PROCEDURE — 36415 COLL VENOUS BLD VENIPUNCTURE: CPT

## 2024-07-02 DIAGNOSIS — E03.9 HYPOTHYROIDISM, UNSPECIFIED TYPE: ICD-10-CM

## 2024-07-02 RX ORDER — LEVOTHYROXINE SODIUM 100 UG/1
TABLET ORAL
Qty: 90 TABLET | Refills: 3 | Status: SHIPPED | OUTPATIENT
Start: 2024-07-02

## 2024-07-02 NOTE — RESULT ENCOUNTER NOTE
Improvement of thyroid at this time - same regimen of 100mcg 5 days and 50mcg 2 days a week. Refills sent

## 2024-07-05 ENCOUNTER — HOSPITAL ENCOUNTER (OUTPATIENT)
Dept: MAMMOGRAPHY | Facility: CLINIC | Age: 59
Discharge: HOME | End: 2024-07-05
Attending: NURSE PRACTITIONER | Admitting: NURSE PRACTITIONER
Payer: COMMERCIAL

## 2024-07-05 DIAGNOSIS — Z12.31 VISIT FOR SCREENING MAMMOGRAM: ICD-10-CM

## 2024-07-05 PROCEDURE — 77063 BREAST TOMOSYNTHESIS BI: CPT

## 2025-01-13 ENCOUNTER — TELEPHONE (OUTPATIENT)
Dept: FAMILY MEDICINE | Facility: CLINIC | Age: 60
End: 2025-01-13
Payer: COMMERCIAL

## 2025-01-13 NOTE — TELEPHONE ENCOUNTER
Symptoms    Describe your symptoms: blood shot eyes for a week and on and off headaches    Any pain: No    How long have you been having symptoms: 1   weeks    Have you been seen for this:  No    Appointment offered?: Yes: No appointments within timeframe of when patient wants to be seen is willing to do virtual or in person     Triage offered?: No    Home remedies tried: Different eye drops    Preferred Pharmacy:   [id number] - [place], [place] - [address]  [address] [facility] [address]  Phone: [phone] Fax: [phone]      Could we send this information to you in [employer] or would you prefer to receive a phone call?:   Patient would prefer a phone call   Okay to leave a detailed message?: Yes at Cell number on file:    Telephone Information:   Mobile [phone]

## 2025-01-14 NOTE — TELEPHONE ENCOUNTER
Triage and appointment offered. Patient took the appointment which was scheduled for tomorrow.    PHOEBE Neal

## 2025-01-15 ENCOUNTER — OFFICE VISIT (OUTPATIENT)
Dept: FAMILY MEDICINE | Facility: CLINIC | Age: 60
End: 2025-01-15
Payer: COMMERCIAL

## 2025-01-15 VITALS
HEIGHT: 64 IN | TEMPERATURE: 98.6 F | BODY MASS INDEX: 24.72 KG/M2 | OXYGEN SATURATION: 98 % | RESPIRATION RATE: 18 BRPM | WEIGHT: 144.8 LBS | HEART RATE: 55 BPM | SYSTOLIC BLOOD PRESSURE: 100 MMHG | DIASTOLIC BLOOD PRESSURE: 62 MMHG

## 2025-01-15 DIAGNOSIS — H57.89 RED EYES: ICD-10-CM

## 2025-01-15 DIAGNOSIS — E06.3 HASHIMOTO'S THYROIDITIS: ICD-10-CM

## 2025-01-15 DIAGNOSIS — Z12.4 CERVICAL CANCER SCREENING: Primary | ICD-10-CM

## 2025-01-15 LAB
T3 SERPL-MCNC: 37 NG/DL (ref 85–202)
T4 FREE SERPL-MCNC: 1.38 NG/DL (ref 0.9–1.7)
TSH SERPL DL<=0.005 MIU/L-ACNC: 3.11 UIU/ML (ref 0.3–4.2)

## 2025-01-15 PROCEDURE — 99214 OFFICE O/P EST MOD 30 MIN: CPT | Performed by: NURSE PRACTITIONER

## 2025-01-15 PROCEDURE — 36415 COLL VENOUS BLD VENIPUNCTURE: CPT | Performed by: NURSE PRACTITIONER

## 2025-01-15 PROCEDURE — 84439 ASSAY OF FREE THYROXINE: CPT | Performed by: NURSE PRACTITIONER

## 2025-01-15 PROCEDURE — 84443 ASSAY THYROID STIM HORMONE: CPT | Performed by: NURSE PRACTITIONER

## 2025-01-15 PROCEDURE — 84480 ASSAY TRIIODOTHYRONINE (T3): CPT | Performed by: NURSE PRACTITIONER

## 2025-01-15 RX ORDER — OLOPATADINE HYDROCHLORIDE 1 MG/ML
SOLUTION/ DROPS OPHTHALMIC
Qty: 5 ML | Refills: 3 | Status: CANCELLED | OUTPATIENT
Start: 2025-01-15

## 2025-01-15 RX ORDER — LOTEPREDNOL ETABONATE 5 MG/ML
1-2 SUSPENSION/ DROPS OPHTHALMIC 3 TIMES DAILY
Qty: 5 ML | Refills: 0 | Status: SHIPPED | OUTPATIENT
Start: 2025-01-15 | End: 2025-01-22

## 2025-01-15 NOTE — PROGRESS NOTES
Assessment & Plan     Hypothyroidism, Hashimoto's  Patient on decreased her levothyroxine after last lab check and is currently taking levo 100 mcg 5 days a week and 50 mcg 2 days a week (previously on levo 100 mcg daily).  Will continue this at this time rechecking TSH T4 T3 today.  Symptomatically patient is feeling better, more energy, less weight gain, improved hair and skin.  Outside of puffiness of her eyes  -Follow-up every 6 months since actively making adjustments on medication in the last year  - TSH with free T4 reflex  - T3, total  - TSH with free T4 reflex  - T3, total    Cervical cancer screening (Primary)  Opts to do this another day.  Patient recommended to schedule annual physical exam  Discussed if normal results patient will be due for Pap at age 59 and again at 64 would be done after this if all is negative for HPV and abnormal cells    Red eyes  Does not appear to have allergic, viral or bacterial conjunctivitis. patient with slight puffiness around the eyes which is consistent with the past symptoms she had of hypothyroidism however feels that she does have eye irritation as the air at her work is dry.  Discussed use of loteprednol for 7 days to reduce inflammation followed by daily use of as needed preservative-free lubricating drops for prevention.  -Patient to follow-up if symptoms worsen or do not resolve with the above treatment option  - loteprednol (LOTEMAX) 0.5 % ophthalmic suspension; Place 1-2 drops into both eyes 3 times daily for 7 days.  Dispense: 5 mL; Refill: 0        RENU Correia [facility]    Adriel Guzman is a 58 year old, presenting for the following health issues:  Eye Problem (2 week)        11/29/2023     7:41 AM   Additional Questions   Roomed by [id number]-lpn   Accompanied by n/a       History of Present Illness       Hypothyroidism:     Since last visit, patient describes the following symptoms::  Anxiety, Constipation,  "Dry skin and Hair loss    She eats 4 or more servings of fruits and vegetables daily.She consumes 0 sweetened beverage(s) daily.She exercises with enough effort to increase her heart rate 10 to 19 minutes per day.  She exercises with enough effort to increase her heart rate 4 days per week.   She is taking medications regularly.       Here for thyroid follow up      Thyroid:   Per last visit HPI: denies hot or cold intolerance,  has hot flashes here and there-will sometimes wake up hot - still occurring and having hot flashes.      Feels her energy better, and dry skin has helped. She feels her hair has improved      Taking magnesium glycinate 400mg.   Having BM daily and lerger quantity - does drink water. Taking \"the greens\" drink     Has been changing her diet: gluten, dairy free, and egg free, has already had difficulties with sinuses, and needed mucinex, feels like a mass, when she talks she feels she needs to be drinking to help her.  Not talking a lot at her job now, but if she did she herrera feel her voice getting tired.      Has been purposely trying to lose weight.  .   Overall, since starting Levothyroxine 100mg congestion has improved, weight gain has improved.  Now she is on levothyroxine 1 tablet 5 days a week and half tablet 2 days a week     Wt Readings from Last 5 Encounters:   01/15/25 65.7 kg (144 lb 12.8 oz)   06/27/24 67.1 kg (148 lb)   11/29/23 69.4 kg (153 lb 1.6 oz)   09/18/23 70.5 kg (155 lb 8 oz)   Purposefully trying to lose weight      Went on an autoimmune diet initially - now trying to hold off on breakfast until 11am and stopping at 4-5 and eat later in the day - eating more vegetable     Taking something OTC every day with her vitamins. F      No longer has globus sensation and is not taking famotidine for GERD.      She feels that when she eats eggs her symptoms are worse - phlegm        #eye irritation - tried eye drops - lumify, rephresh, sustain - didn't see improvement   She feels " "like she can't open her eyes all the way  Has occurred for a couple weeks  Has been working more  Denies discharge, vision changes      Review of Systems   Constitutional, HEENT, cardiovascular, pulmonary, gi and gu systems are negative, except as otherwise noted.      Objective    /62 (BP Location: Right arm, Patient Position: Sitting, Cuff Size: Adult Regular)   Pulse 55   Temp 98.6  F (37  C) (Oral)   Resp 18   Ht 1.626 m (5' 4\")   Wt 65.7 kg (144 lb 12.8 oz)   SpO2 98%   BMI 24.85 kg/m    Body mass index is 24.85 kg/m .  Physical Exam   GENERAL: healthy, alert and no distress  NECK: no adenopathy, no asymmetry, masses, or scars and thyroid normal to palpation  Eyes: , Bilateral sclera injection noted, minor orbital puffiness bilaterally, nontender in these regions, no obvious discharge, no erythema or swelling of upper and lower lids, EOMI, PERRLA  RESP: lungs clear to auscultation - no rales, rhonchi or wheezes  CV: regular rate and rhythm, normal S1 S2, no S3 or S4, no murmur, click or rub, no peripheral edema and peripheral pulses strong  MS: no gross musculoskeletal defects noted, no edema                      "

## 2025-03-11 ENCOUNTER — E-VISIT (OUTPATIENT)
Dept: URGENT CARE | Facility: CLINIC | Age: 60
End: 2025-03-11
Payer: COMMERCIAL

## 2025-03-11 DIAGNOSIS — J01.90 ACUTE SINUSITIS WITH SYMPTOMS > 10 DAYS: Primary | ICD-10-CM

## 2025-03-11 RX ORDER — DOXYCYCLINE HYCLATE 100 MG
100 TABLET ORAL 2 TIMES DAILY
Qty: 14 TABLET | Refills: 0 | Status: SHIPPED | OUTPATIENT
Start: 2025-03-11 | End: 2025-03-18

## 2025-03-11 NOTE — PATIENT INSTRUCTIONS
Acute Sinusitis: Care Instructions  Overview     Acute sinusitis is an inflammation of the mucous membranes inside the nose and sinuses. Sinuses are the hollow spaces in your skull around the eyes and nose. Acute sinusitis often follows a cold. Acute sinusitis causes thick, discolored mucus that drains from the nose or down the back of the throat. It also can cause pain and pressure in your head and face along with a stuffy or blocked nose.  In most cases, sinusitis gets better on its own in 1 to 2 weeks. But some mild symptoms may last for several weeks. Sometimes antibiotics are needed if there is a bacterial infection.  Follow-up care is a key part of your treatment and safety. Be sure to make and go to all appointments, and call your doctor if you are having problems. It's also a good idea to know your test results and keep a list of the medicines you take.  How can you care for yourself at home?  Use saline (saltwater) nasal washes. This can help keep your nasal passages open and wash out mucus and allergens.  You can buy saline nose washes at a grocery store or drugstore. Follow the instructions on the package.  You can make your own at home. Add 1 teaspoon of non-iodized salt and 1 teaspoon of baking soda to 2 cups of distilled or boiled and cooled water. Fill a squeeze bottle or a nasal cleansing pot (such as a neti pot) with the nasal wash. Then put the tip into your nostril, and lean over the sink. With your mouth open, gently squirt the liquid. Repeat on the other side.  Try a decongestant nasal spray like oxymetazoline (Afrin). Do not use it for more than 3 days in a row. Using it for more than 3 days can make your congestion worse.  If needed, take an over-the-counter pain medicine, such as acetaminophen (Tylenol), ibuprofen (Advil, Motrin), or naproxen (Aleve). Read and follow all instructions on the label.  If the doctor prescribed antibiotics, take them as directed. Do not stop taking them just  "because you feel better. You need to take the full course of antibiotics.  Be careful when taking over-the-counter cold or flu medicines and Tylenol at the same time. Many of these medicines have acetaminophen, which is Tylenol. Read the labels to make sure that you are not taking more than the recommended dose. Too much acetaminophen (Tylenol) can be harmful.  Try a steroid nasal spray. It may help with your symptoms.  Breathe warm, moist air. You can use a steamy shower, a hot bath, or a sink filled with hot water. Avoid cold, dry air. Using a humidifier in your home may help. Follow the directions for cleaning the machine.  When should you call for help?   Call your doctor now or seek immediate medical care if:    You have new or worse swelling, redness, or pain in your face or around one or both of your eyes.     You have double vision or a change in your vision.     You have a high fever.     You have a severe headache and a stiff neck.     You have mental changes, such as feeling confused or much less alert.   Watch closely for changes in your health, and be sure to contact your doctor if:    You are not getting better as expected.   Where can you learn more?  Go to https://www.[employer].net/patiented  Enter I933 in the search box to learn more about \"Acute Sinusitis: Care Instructions.\"  Current as of: September 27, 2023  Content Version: 14.3    2024 [employer].   Care instructions adapted under license by your healthcare professional. If you have questions about a medical condition or this instruction, always ask your healthcare professional. [employer] disclaims any warranty or liability for your use of this information.    Dear Megan Church    After reviewing your responses, I've been able to diagnose you with sinusitis.      Based on your responses and diagnosis, I have prescribed doxycycline to treat your symptoms. I have sent this to your pharmacy.?     It is also " important to stay well hydrated, get lots of rest and take over-the-counter decongestants,?tylenol?or ibuprofen if you?are able to?take those medications per your primary care provider to help relieve discomfort.?     It is important that you take?all of?your prescribed medication even if your symptoms are improving after a few doses.? Taking?all of?your medicine helps prevent the symptoms from returning.?     If your symptoms worsen, you develop severe headache, vomiting, high fever (>102), or are not improving in 7 days, please contact your primary care provider for an appointment or visit any of our convenient Walk-in Care or Urgent Care Centers to be seen which can be found on our website?here.?     Thanks again for choosing?us?as your health care partner,?   ?  Valeria Velez, PREETHI?

## 2025-04-24 ENCOUNTER — TELEPHONE (OUTPATIENT)
Dept: FAMILY MEDICINE | Facility: CLINIC | Age: 60
End: 2025-04-24

## 2025-05-19 ENCOUNTER — RESULTS FOLLOW-UP (OUTPATIENT)
Dept: FAMILY MEDICINE | Facility: CLINIC | Age: 60
End: 2025-05-19

## 2025-06-08 ENCOUNTER — HEALTH MAINTENANCE LETTER (OUTPATIENT)
Age: 60
End: 2025-06-08

## 2025-06-27 SDOH — HEALTH STABILITY: PHYSICAL HEALTH: ON AVERAGE, HOW MANY MINUTES DO YOU ENGAGE IN EXERCISE AT THIS LEVEL?: 90 MIN

## 2025-06-27 SDOH — HEALTH STABILITY: PHYSICAL HEALTH: ON AVERAGE, HOW MANY DAYS PER WEEK DO YOU ENGAGE IN MODERATE TO STRENUOUS EXERCISE (LIKE A BRISK WALK)?: 7 DAYS

## 2025-06-27 ASSESSMENT — SOCIAL DETERMINANTS OF HEALTH (SDOH): HOW OFTEN DO YOU GET TOGETHER WITH FRIENDS OR RELATIVES?: ONCE A WEEK

## 2025-06-30 ENCOUNTER — OFFICE VISIT (OUTPATIENT)
Dept: FAMILY MEDICINE | Facility: CLINIC | Age: 60
End: 2025-06-30
Payer: COMMERCIAL

## 2025-06-30 VITALS
OXYGEN SATURATION: 100 % | WEIGHT: 127.5 LBS | BODY MASS INDEX: 22.59 KG/M2 | HEIGHT: 63 IN | HEART RATE: 61 BPM | DIASTOLIC BLOOD PRESSURE: 62 MMHG | TEMPERATURE: 97.3 F | RESPIRATION RATE: 10 BRPM | SYSTOLIC BLOOD PRESSURE: 93 MMHG

## 2025-06-30 DIAGNOSIS — E78.2 MIXED HYPERLIPIDEMIA: ICD-10-CM

## 2025-06-30 DIAGNOSIS — E06.3 HASHIMOTO'S THYROIDITIS: ICD-10-CM

## 2025-06-30 DIAGNOSIS — Z12.4 CERVICAL CANCER SCREENING: ICD-10-CM

## 2025-06-30 DIAGNOSIS — Z00.00 ROUTINE GENERAL MEDICAL EXAMINATION AT A HEALTH CARE FACILITY: Primary | ICD-10-CM

## 2025-06-30 DIAGNOSIS — R53.83 FATIGUE, UNSPECIFIED TYPE: ICD-10-CM

## 2025-06-30 DIAGNOSIS — R79.89 ELEVATED SERUM CREATININE: ICD-10-CM

## 2025-06-30 DIAGNOSIS — Z13.220 LIPID SCREENING: ICD-10-CM

## 2025-06-30 LAB
ANION GAP SERPL CALCULATED.3IONS-SCNC: 11 MMOL/L (ref 7–15)
BUN SERPL-MCNC: 21.7 MG/DL (ref 8–23)
CALCIUM SERPL-MCNC: 9.8 MG/DL (ref 8.8–10.4)
CHLORIDE SERPL-SCNC: 101 MMOL/L (ref 98–107)
CHOLEST SERPL-MCNC: 524 MG/DL
CREAT SERPL-MCNC: 0.7 MG/DL (ref 0.51–0.95)
EGFRCR SERPLBLD CKD-EPI 2021: >90 ML/MIN/1.73M2
ERYTHROCYTE [DISTWIDTH] IN BLOOD BY AUTOMATED COUNT: 13 % (ref 10–15)
FASTING STATUS PATIENT QL REPORTED: YES
FASTING STATUS PATIENT QL REPORTED: YES
GLUCOSE SERPL-MCNC: 94 MG/DL (ref 70–99)
HCO3 SERPL-SCNC: 26 MMOL/L (ref 22–29)
HCT VFR BLD AUTO: 39.7 % (ref 35–47)
HDLC SERPL-MCNC: 138 MG/DL
HGB BLD-MCNC: 12.6 G/DL (ref 11.7–15.7)
LDLC SERPL CALC-MCNC: 379 MG/DL
MCH RBC QN AUTO: 28.4 PG (ref 26.5–33)
MCHC RBC AUTO-ENTMCNC: 31.7 G/DL (ref 31.5–36.5)
MCV RBC AUTO: 90 FL (ref 78–100)
NONHDLC SERPL-MCNC: 386 MG/DL
PLATELET # BLD AUTO: 275 10E3/UL (ref 150–450)
POTASSIUM SERPL-SCNC: 4.5 MMOL/L (ref 3.4–5.3)
RBC # BLD AUTO: 4.43 10E6/UL (ref 3.8–5.2)
SODIUM SERPL-SCNC: 138 MMOL/L (ref 135–145)
TRIGL SERPL-MCNC: 36 MG/DL
WBC # BLD AUTO: 5 10E3/UL (ref 4–11)

## 2025-06-30 PROCEDURE — 3074F SYST BP LT 130 MM HG: CPT | Performed by: NURSE PRACTITIONER

## 2025-06-30 PROCEDURE — 85027 COMPLETE CBC AUTOMATED: CPT | Performed by: NURSE PRACTITIONER

## 2025-06-30 PROCEDURE — 36415 COLL VENOUS BLD VENIPUNCTURE: CPT | Performed by: NURSE PRACTITIONER

## 2025-06-30 PROCEDURE — 84460 ALANINE AMINO (ALT) (SGPT): CPT | Performed by: NURSE PRACTITIONER

## 2025-06-30 PROCEDURE — 80061 LIPID PANEL: CPT | Performed by: NURSE PRACTITIONER

## 2025-06-30 PROCEDURE — 80048 BASIC METABOLIC PNL TOTAL CA: CPT | Performed by: NURSE PRACTITIONER

## 2025-06-30 PROCEDURE — 87624 HPV HI-RISK TYP POOLED RSLT: CPT | Performed by: NURSE PRACTITIONER

## 2025-06-30 PROCEDURE — G0145 SCR C/V CYTO,THINLAYER,RESCR: HCPCS | Performed by: NURSE PRACTITIONER

## 2025-06-30 PROCEDURE — 3078F DIAST BP <80 MM HG: CPT | Performed by: NURSE PRACTITIONER

## 2025-06-30 PROCEDURE — 99396 PREV VISIT EST AGE 40-64: CPT | Performed by: NURSE PRACTITIONER

## 2025-06-30 PROCEDURE — 99214 OFFICE O/P EST MOD 30 MIN: CPT | Mod: 25 | Performed by: NURSE PRACTITIONER

## 2025-06-30 PROCEDURE — 1126F AMNT PAIN NOTED NONE PRSNT: CPT | Performed by: NURSE PRACTITIONER

## 2025-06-30 ASSESSMENT — PAIN SCALES - GENERAL: PAINLEVEL_OUTOF10: NO PAIN (0)

## 2025-06-30 NOTE — PROGRESS NOTES
Preventive Care Visit  [facility]  Cindy Styles CNP, Nurse Practitioner Primary Care  Jun 30, 2025        Adriel Ash is a 60 year old, presenting for the following:  Physical (Annual Physical. Due for Pap. )        6/30/2025     7:12 AM   Additional Questions   Roomed by Elham JOHNSON CMA          HPI  Overall feeling well.  Some fatigue at times.     Hypothyroidism Follow-up    Since last visit, patient describes the following symptoms: Weight stable, no hair loss, no skin changes, no constipation, no loose stools  Advance Care Planning    Discussed advance care planning with patient; however, patient declined at this time.        6/27/2025   General Health   How would you rate your overall physical health? Good   Feel stress (tense, anxious, or unable to sleep) Only a little   (!) STRESS CONCERN      6/27/2025   Nutrition   Three or more servings of calcium each day? Yes   Diet: Carbohydrate counting    Gluten-free/reduced   How many servings of fruit and vegetables per day? (!) 0-1   How many sweetened beverages each day? 0-1       Multiple values from one day are sorted in reverse-chronological order         6/27/2025   Exercise   Days per week of moderate/strenous exercise 7 days   Average minutes spent exercising at this level 90 min         6/27/2025   Social Factors   Frequency of gathering with friends or relatives Once a week   Worry food won't last until get money to buy more No   Food not last or not have enough money for food? No   Do you have housing? (Housing is defined as stable permanent housing and does not include staying outside in a car, in a tent, in an abandoned building, in an overnight shelter, or couch-surfing.) Yes   Are you worried about losing your housing? No   Lack of transportation? No   Unable to get utilities (heat,electricity)? No         6/27/2025   Fall Risk   Fallen 2 or more times in the past year? No   Trouble with walking or balance? No           6/27/2025   Dental   Dentist two times every year? Yes           Today's PHQ-2 Score:       1/15/2025    10:43 AM   PHQ-2 ( 1999 [employer])   Q1: Little interest or pleasure in doing things 0   Q2: Feeling down, depressed or hopeless 0   PHQ-2 Score 0    Q1: Little interest or pleasure in doing things Not at all   Q2: Feeling down, depressed or hopeless Not at all   PHQ-2 Score 0       Patient-reported         6/27/2025   Substance Use   Alcohol more than 3/day or more than 7/wk No   Do you use any other substances recreationally? No     Social History     Tobacco Use    Smoking status: Never     Passive exposure: Never    Smokeless tobacco: Never   Vaping Use    Vaping status: Never Used   Substance Use Topics    Alcohol use: Not Currently    Drug use: Never           7/5/2024   LAST FHS-7 RESULTS   1st degree relative breast or ovarian cancer No   Any relative bilateral breast cancer No   Any male have breast cancer No   Any ONE woman have BOTH breast AND ovarian cancer No   Any woman with breast cancer before 50yrs No   2 or more relatives with breast AND/OR ovarian cancer No   2 or more relatives with breast AND/OR bowel cancer No        Mammogram Screening - Mammogram every 1-2 years updated in Health Maintenance based on mutual decision making        6/27/2025   STI Screening   New sexual partner(s) since last STI/HIV test? No     History of abnormal Pap smear: No - age 30- 64 PAP with HPV every 5 years recommended       ASCVD Risk   The ASCVD Risk score (Brent TUCKER, et al., 2019) failed to calculate for the following reasons:    The valid HDL cholesterol range is 20 to 100 mg/dL    The valid total cholesterol range is 130 to 320 mg/dL         Reviewed and updated as needed this visit by Provider                    Past Medical History:   Diagnosis Date    Arthritis 2008    Seasonal allergic rhinitis     Thyroid disease 8/2023     Past Surgical History:   Procedure Laterality Date    APPENDECTOMY    "   COLONOSCOPY  2014     Lab work is in process  Labs reviewed in EPIC    Review of Systems  Constitutional, HEENT, cardiovascular, pulmonary, GI, , musculoskeletal, neuro, skin, endocrine and psych systems are negative, except as otherwise noted.     Objective    Exam  BP 93/62 (BP Location: Left arm, Patient Position: Sitting, Cuff Size: Adult Regular)   Pulse 61   Temp 97.3  F (36.3  C) (Oral)   Resp 10   Ht 1.607 m (5' 3.25\")   Wt 57.8 kg (127 lb 8 oz)   SpO2 100%   Breastfeeding No   BMI 22.41 kg/m     Estimated body mass index is 22.41 kg/m  as calculated from the following:    Height as of this encounter: 1.607 m (5' 3.25\").    Weight as of this encounter: 57.8 kg (127 lb 8 oz).    Physical Exam  GENERAL: alert and no distress  EYES: Eyes grossly normal to inspection, PERRL and conjunctivae and sclerae normal  HENT: ear canals and TM's normal, nose and mouth without ulcers or lesions  NECK: no adenopathy, no asymmetry, masses, or scars  RESP: lungs clear to auscultation - no rales, rhonchi or wheezes  BREAST: normal without masses, tenderness or nipple discharge and no palpable axillary masses or adenopathy  CV: regular rate and rhythm, normal S1 S2, no S3 or S4, no murmur, click or rub, no peripheral edema  ABDOMEN: soft, nontender, no hepatosplenomegaly, no masses and bowel sounds normal   (female) w/bimanual: normal female external genitalia, normal urethral meatus, normal vaginal mucosa, and normal cervix/adnexa/uterus without masses or discharge.  Pap taken with cytobrush & spatula- Thin Prep sample obtained.   MS: no gross musculoskeletal defects noted, no edema  SKIN: no suspicious lesions or rashes  NEURO: Normal strength and tone, mentation intact and speech normal  PSYCH: mentation appears normal, affect normal/bright    A/P:  1. Routine general medical examination at a health care facility (Primary)    2. Cervical cancer screening  - HPV and Gynecologic Cytology Panel - Recommended Age " 30 - 65 Years    3. Elevated serum creatinine  -BMP    4. Hashimoto's thyroiditis  Stable/controlled    5. Lipid screening  - Lipid panel reflex to direct LDL Fasting; Future  - Lipid panel reflex to direct LDL Fasting    6. Fatigue, unspecified type  - Basic metabolic panel  (Ca, Cl, CO2, Creat, Gluc, K, Na, BUN); Future  - CBC with platelets; Future  - Basic metabolic panel  (Ca, Cl, CO2, Creat, Gluc, K, Na, BUN)  - CBC with platelets      Signed Electronically by: Cindy Styles, CNP

## 2025-06-30 NOTE — PATIENT INSTRUCTIONS
Patient Education   Preventive Care Advice   This is general advice given by our system to help you stay healthy. However, your care team may have specific advice just for you. Please talk to your care team about your preventive care needs.  Nutrition  Eat 5 or more servings of fruits and vegetables each day.  Try wheat bread, brown rice and whole grain pasta (instead of white bread, rice, and pasta).  Get enough calcium and vitamin D. Check the label on foods and aim for 100% of the RDA (recommended daily allowance).  Lifestyle  Exercise at least 150 minutes each week  (30 minutes a day, 5 days a week).  Do muscle strengthening activities 2 days a week. These help control your weight and prevent disease.  No smoking.  Wear sunscreen to prevent skin cancer.  Have a dental exam and cleaning every 6 months.  Yearly exams  See your health care team every year to talk about:  Any changes in your health.  Any medicines your care team has prescribed.  Preventive care, family planning, and ways to prevent chronic diseases.  Shots (vaccines)   HPV shots (up to age 26), if you've never had them before.  Hepatitis B shots (up to age 59), if you've never had them before.  COVID-19 shot: Get this shot when it's due.  Flu shot: Get a flu shot every year.  Tetanus shot: Get a tetanus shot every 10 years.  Pneumococcal, hepatitis A, and RSV shots: Ask your care team if you need these based on your risk.  Shingles shot (for age 50 and up)  General health tests  Diabetes screening:  Starting at age 35, Get screened for diabetes at least every 3 years.  If you are younger than age 35, ask your care team if you should be screened for diabetes.  Cholesterol test: At age 39, start having a cholesterol test every 5 years, or more often if advised.  Bone density scan (DEXA): At age 50, ask your care team if you should have this scan for osteoporosis (brittle bones).  Hepatitis C: Get tested at least once in your life.  STIs (sexually  transmitted infections)  Before age 24: Ask your care team if you should be screened for STIs.  After age 24: Get screened for STIs if you're at risk. You are at risk for STIs (including HIV) if:  You are sexually active with more than one person.  You don't use condoms every time.  You or a partner was diagnosed with a sexually transmitted infection.  If you are at risk for HIV, ask about PrEP medicine to prevent HIV.  Get tested for HIV at least once in your life, whether you are at risk for HIV or not.  Cancer screening tests  Cervical cancer screening: If you have a cervix, begin getting regular cervical cancer screening tests starting at age 21.  Breast cancer scan (mammogram): If you've ever had breasts, begin having regular mammograms starting at age 40. This is a scan to check for breast cancer.  Colon cancer screening: It is important to start screening for colon cancer at age 45.  Have a colonoscopy test every 10 years (or more often if you're at risk) Or, ask your provider about stool tests like a FIT test every year or Cologuard test every 3 years.  To learn more about your testing options, visit:   .  For help making a decision, visit:   https://bit.ly/pa31062.  Prostate cancer screening test: If you have a prostate, ask your care team if a prostate cancer screening test (PSA) at age 55 is right for you.  Lung cancer screening: If you are a current or former smoker ages 50 to 80, ask your care team if ongoing lung cancer screenings are right for you.  For informational purposes only. Not to replace the advice of your health care provider. Copyright   2023 [employer]. All rights reserved. Clinically reviewed by the [facility] Transitions Program. [employer] [id number] - REV 01/24.

## 2025-07-01 ENCOUNTER — PATIENT OUTREACH (OUTPATIENT)
Dept: GASTROENTEROLOGY | Facility: CLINIC | Age: 60
End: 2025-07-01
Payer: COMMERCIAL

## 2025-07-01 ENCOUNTER — RESULTS FOLLOW-UP (OUTPATIENT)
Dept: FAMILY MEDICINE | Facility: CLINIC | Age: 60
End: 2025-07-01
Payer: COMMERCIAL

## 2025-07-01 DIAGNOSIS — E78.2 MIXED HYPERLIPIDEMIA: Primary | ICD-10-CM

## 2025-07-01 LAB
ALT SERPL W P-5'-P-CCNC: 76 U/L (ref 0–50)
HPV HR 12 DNA CVX QL NAA+PROBE: NEGATIVE
HPV16 DNA CVX QL NAA+PROBE: NEGATIVE
HPV18 DNA CVX QL NAA+PROBE: NEGATIVE
HUMAN PAPILLOMA VIRUS FINAL DIAGNOSIS: NORMAL

## 2025-07-01 RX ORDER — ATORVASTATIN CALCIUM 20 MG/1
20 TABLET, FILM COATED ORAL DAILY
Qty: 90 TABLET | Refills: 1 | Status: SHIPPED | OUTPATIENT
Start: 2025-07-01

## 2025-07-01 NOTE — TELEPHONE ENCOUNTER
Provider Recommendation Follow Up:   Reached Nilsa. Informed of provider's recommendations. Patient stated that she would rather not start a statin now and is going to change diet and exercise.  Sent patient references for ways to improve cholesterol with diet and lifestyle changes.  She did schedule a follow up lab as requested.        MARLI DunhamN RN  [facility]

## 2025-07-01 NOTE — TELEPHONE ENCOUNTER
Provider Recommendation Follow Up:   Reached Nilsa. Informed of provider's recommendations. Patient verbalized understanding and agrees with the plan.       SUDHIR Dunham RN  [facility]      Nilsa,  Your cholesterol levels are extremely high- the highest I've ever seen.  If you don't want to start a statin medication then please schedule an appointment with the cardiology/lipid clinic to look at all options- but diet and exercise won't be enough to lower your levels out of this dangerous zone.       Take care,  Cindy Styles, CNP

## 2025-07-01 NOTE — TELEPHONE ENCOUNTER
Leeann Mayfield RN called [facility] on July 1, 2025 and left a message to return the call to the clinic.  If patient calls back, please route to [facility].    TC please route to RN.    SUDHIR Dunham RN  [facility]    Please call patient-ALT is high- avoid any alcohol use, avoid Tylenol. Recheck in 1 month after startin

## 2025-07-03 LAB
BKR AP ASSOCIATED HPV REPORT: NORMAL
BKR LAB AP GYN ADEQUACY: NORMAL
BKR LAB AP GYN INTERPRETATION: NORMAL
BKR LAB AP PREVIOUS ABNORMAL: NORMAL
PATH REPORT.COMMENTS IMP SPEC: NORMAL
PATH REPORT.COMMENTS IMP SPEC: NORMAL
PATH REPORT.RELEVANT HX SPEC: NORMAL

## 2025-07-14 ENCOUNTER — PATIENT OUTREACH (OUTPATIENT)
Dept: CARE COORDINATION | Facility: CLINIC | Age: 60
End: 2025-07-14
Payer: COMMERCIAL

## 2025-07-16 ENCOUNTER — PATIENT OUTREACH (OUTPATIENT)
Dept: CARE COORDINATION | Facility: CLINIC | Age: 60
End: 2025-07-16
Payer: COMMERCIAL